# Patient Record
Sex: MALE | Race: WHITE | Employment: OTHER | URBAN - NONMETROPOLITAN AREA
[De-identification: names, ages, dates, MRNs, and addresses within clinical notes are randomized per-mention and may not be internally consistent; named-entity substitution may affect disease eponyms.]

---

## 2018-07-02 LAB
CREATININE, URINE: NORMAL
MICROALBUMIN/CREAT 24H UR: 0.07 MG/G{CREAT}
MICROALBUMIN/CREAT UR-RTO: NORMAL

## 2018-07-17 ENCOUNTER — OFFICE VISIT (OUTPATIENT)
Dept: ORTHOPEDIC SURGERY | Age: 83
End: 2018-07-17
Payer: COMMERCIAL

## 2018-07-17 VITALS
DIASTOLIC BLOOD PRESSURE: 87 MMHG | HEART RATE: 90 BPM | BODY MASS INDEX: 25.33 KG/M2 | HEIGHT: 72 IN | WEIGHT: 187 LBS | TEMPERATURE: 98 F | SYSTOLIC BLOOD PRESSURE: 149 MMHG

## 2018-07-17 DIAGNOSIS — M25.551 HIP PAIN, RIGHT: Primary | ICD-10-CM

## 2018-07-17 PROCEDURE — 99203 OFFICE O/P NEW LOW 30 MIN: CPT | Performed by: ORTHOPAEDIC SURGERY

## 2018-07-17 RX ORDER — MEMANTINE HYDROCHLORIDE 10 MG/1
TABLET ORAL
Refills: 0 | COMMUNITY
Start: 2018-07-02 | End: 2019-08-30 | Stop reason: SDUPTHER

## 2018-07-17 RX ORDER — LISINOPRIL 30 MG/1
TABLET ORAL
Refills: 0 | COMMUNITY
Start: 2018-07-02 | End: 2019-12-02 | Stop reason: ALTCHOICE

## 2018-07-17 RX ORDER — LEVOTHYROXINE SODIUM 137 UG/1
TABLET ORAL
Refills: 0 | COMMUNITY
Start: 2018-07-02 | End: 2019-08-30 | Stop reason: SDUPTHER

## 2018-07-17 RX ORDER — PRAVASTATIN SODIUM 40 MG
TABLET ORAL
Refills: 0 | COMMUNITY
Start: 2018-07-02 | End: 2019-08-30 | Stop reason: SDUPTHER

## 2018-07-17 NOTE — PROGRESS NOTES
New Hip Patient     Referring Provider:   Yvonne agustin, [address]:   Chief Complaint   Patient presents with    Hip Pain     (R) hip pain x 3 month    Results     (R) hip XR Prima        HPI:    Whit López is a 80y.o. year old  male who is seen today  for evaluation of right hip pain. He states the pain is actually more in his distal femur by the knee with radiation up to the hip. Currently he is having no pain. He has not had any treatment. The pain is worse in the morning. It is better throughout the day. He ambulates without assistance. . The patient is not working. PAST MEDICAL HISTORY  Past Medical History:   Diagnosis Date    Arthritis     High cholesterol     Hypertension     Leg pain     Thyroid disease        PAST SURGICAL HISTORY  Past Surgical History:   Procedure Laterality Date    SKIN CANCER EXCISION      face       FAMILY HISTORY   No family history on file. SOCIAL HISTORY  Social History     Occupational History    Not on file.      Social History Main Topics    Smoking status: Current Every Day Smoker     Types: Cigarettes    Smokeless tobacco: Never Used      Comment: roll his own    Alcohol use No      Comment: rare    Drug use: No    Sexual activity: Not on file       CURRENT MEDICATIONS     Current Outpatient Prescriptions:     levothyroxine (SYNTHROID) 137 MCG tablet, take 1 tablet by mouth once daily, Disp: , Rfl: 0    lisinopril (PRINIVIL;ZESTRIL) 30 MG tablet, take 1 tablet by mouth once daily, Disp: , Rfl: 0    memantine (NAMENDA) 10 MG tablet, take 1 tablet by mouth once daily, Disp: , Rfl: 0    pravastatin (PRAVACHOL) 40 MG tablet, take 1 tablet by mouth once daily, Disp: , Rfl: 0    ALLERGIES  No Known Allergies    Controlled Substances Monitoring:      REVIEW OF SYSTEMS:     Constitutional:  Negative for weight loss, fevers, chills, fatigue  Cardiovascular: Negative for chest pain,

## 2019-01-04 LAB
ALBUMIN SERPL-MCNC: NORMAL G/DL
ALP BLD-CCNC: NORMAL U/L
ALT SERPL-CCNC: NORMAL U/L
ANION GAP SERPL CALCULATED.3IONS-SCNC: NORMAL MMOL/L
AST SERPL-CCNC: NORMAL U/L
AVERAGE GLUCOSE: NORMAL
BILIRUB SERPL-MCNC: NORMAL MG/DL
BUN BLDV-MCNC: NORMAL MG/DL
CALCIUM SERPL-MCNC: NORMAL MG/DL
CHLORIDE BLD-SCNC: NORMAL MMOL/L
CO2: NORMAL
CREAT SERPL-MCNC: NORMAL MG/DL
CREATININE: 1 MG/DL
GFR CALCULATED: NORMAL
GLUCOSE BLD-MCNC: NORMAL MG/DL
HBA1C MFR BLD: 5.1 %
POTASSIUM (K+): 4.4
POTASSIUM SERPL-SCNC: NORMAL MMOL/L
SODIUM BLD-SCNC: NORMAL MMOL/L
TOTAL PROTEIN: NORMAL
TSH SERPL DL<=0.05 MIU/L-ACNC: 0.38 UIU/ML

## 2019-03-21 VITALS
BODY MASS INDEX: 24.92 KG/M2 | TEMPERATURE: 97.5 F | HEIGHT: 72 IN | DIASTOLIC BLOOD PRESSURE: 80 MMHG | SYSTOLIC BLOOD PRESSURE: 142 MMHG | WEIGHT: 184 LBS | HEART RATE: 86 BPM

## 2019-03-21 RX ORDER — CLOTRIMAZOLE AND BETAMETHASONE DIPROPIONATE 10; .64 MG/G; MG/G
CREAM TOPICAL 2 TIMES DAILY
COMMUNITY
Start: 2016-05-02 | End: 2019-08-30 | Stop reason: SDUPTHER

## 2019-03-21 RX ORDER — ASPIRIN 325 MG
1 TABLET, DELAYED RELEASE (ENTERIC COATED) ORAL DAILY
COMMUNITY

## 2019-03-21 RX ORDER — LISINOPRIL AND HYDROCHLOROTHIAZIDE 20; 12.5 MG/1; MG/1
1 TABLET ORAL 2 TIMES DAILY
COMMUNITY
Start: 2019-01-28 | End: 2019-08-30 | Stop reason: SDUPTHER

## 2019-03-21 RX ORDER — IRBESARTAN AND HYDROCHLOROTHIAZIDE 300; 12.5 MG/1; MG/1
1 TABLET, FILM COATED ORAL DAILY
COMMUNITY
Start: 2018-09-04 | End: 2019-08-30

## 2019-08-30 ENCOUNTER — OFFICE VISIT (OUTPATIENT)
Dept: FAMILY MEDICINE CLINIC | Age: 84
End: 2019-08-30
Payer: COMMERCIAL

## 2019-08-30 ENCOUNTER — HOSPITAL ENCOUNTER (OUTPATIENT)
Age: 84
Discharge: HOME | End: 2019-09-01
Payer: COMMERCIAL

## 2019-08-30 VITALS
HEART RATE: 83 BPM | WEIGHT: 175.6 LBS | TEMPERATURE: 97.2 F | SYSTOLIC BLOOD PRESSURE: 134 MMHG | BODY MASS INDEX: 23.78 KG/M2 | DIASTOLIC BLOOD PRESSURE: 74 MMHG | OXYGEN SATURATION: 97 % | HEIGHT: 72 IN

## 2019-08-30 DIAGNOSIS — E03.9 HYPOTHYROIDISM, UNSPECIFIED TYPE: ICD-10-CM

## 2019-08-30 DIAGNOSIS — R79.9 ABNORMAL FINDING OF BLOOD CHEMISTRY: ICD-10-CM

## 2019-08-30 DIAGNOSIS — E78.2 MIXED HYPERLIPIDEMIA: ICD-10-CM

## 2019-08-30 DIAGNOSIS — E78.2 MIXED HYPERLIPIDEMIA: Primary | ICD-10-CM

## 2019-08-30 DIAGNOSIS — M19.90 ARTHRITIS: ICD-10-CM

## 2019-08-30 DIAGNOSIS — Z12.5 SCREENING PSA (PROSTATE SPECIFIC ANTIGEN): ICD-10-CM

## 2019-08-30 DIAGNOSIS — I63.9 CEREBROVASCULAR ACCIDENT (CVA), UNSPECIFIED MECHANISM (HCC): ICD-10-CM

## 2019-08-30 DIAGNOSIS — I10 ESSENTIAL HYPERTENSION: ICD-10-CM

## 2019-08-30 LAB
ALBUMIN SERPL-MCNC: 4.5 G/DL (ref 3.5–5.2)
ALP BLD-CCNC: 69 U/L (ref 40–129)
ALT SERPL-CCNC: 19 U/L (ref 0–40)
ANION GAP SERPL CALCULATED.3IONS-SCNC: 18 MMOL/L (ref 7–16)
AST SERPL-CCNC: 27 U/L (ref 0–39)
BASOPHILS ABSOLUTE: 0.16 E9/L (ref 0–0.2)
BASOPHILS RELATIVE PERCENT: 1.5 % (ref 0–2)
BILIRUB SERPL-MCNC: 0.3 MG/DL (ref 0–1.2)
BILIRUBIN URINE: NEGATIVE
BLOOD, URINE: NEGATIVE
BUN BLDV-MCNC: 18 MG/DL (ref 8–23)
CALCIUM SERPL-MCNC: 10.1 MG/DL (ref 8.6–10.2)
CHLORIDE BLD-SCNC: 103 MMOL/L (ref 98–107)
CHOLESTEROL, TOTAL: 154 MG/DL (ref 0–199)
CLARITY: CLEAR
CO2: 21 MMOL/L (ref 22–29)
COLOR: YELLOW
CREAT SERPL-MCNC: 1.2 MG/DL (ref 0.7–1.2)
EOSINOPHILS ABSOLUTE: 0.39 E9/L (ref 0.05–0.5)
EOSINOPHILS RELATIVE PERCENT: 3.6 % (ref 0–6)
GFR AFRICAN AMERICAN: >60
GFR NON-AFRICAN AMERICAN: 58 ML/MIN/1.73
GLUCOSE BLD-MCNC: 89 MG/DL (ref 74–99)
GLUCOSE URINE: NEGATIVE MG/DL
HBA1C MFR BLD: 5.3 % (ref 4–5.6)
HCT VFR BLD CALC: 45.6 % (ref 37–54)
HDLC SERPL-MCNC: 47 MG/DL
HEMOGLOBIN: 14.3 G/DL (ref 12.5–16.5)
IMMATURE GRANULOCYTES #: 0.02 E9/L
IMMATURE GRANULOCYTES %: 0.2 % (ref 0–5)
KETONES, URINE: 15 MG/DL
LDL CHOLESTEROL CALCULATED: 87 MG/DL (ref 0–99)
LEUKOCYTE ESTERASE, URINE: NEGATIVE
LYMPHOCYTES ABSOLUTE: 2.79 E9/L (ref 1.5–4)
LYMPHOCYTES RELATIVE PERCENT: 25.9 % (ref 20–42)
MCH RBC QN AUTO: 32.4 PG (ref 26–35)
MCHC RBC AUTO-ENTMCNC: 31.4 % (ref 32–34.5)
MCV RBC AUTO: 103.2 FL (ref 80–99.9)
MONOCYTES ABSOLUTE: 0.86 E9/L (ref 0.1–0.95)
MONOCYTES RELATIVE PERCENT: 8 % (ref 2–12)
NEUTROPHILS ABSOLUTE: 6.54 E9/L (ref 1.8–7.3)
NEUTROPHILS RELATIVE PERCENT: 60.8 % (ref 43–80)
NITRITE, URINE: NEGATIVE
PDW BLD-RTO: 12.9 FL (ref 11.5–15)
PH UA: 5 (ref 5–9)
PLATELET # BLD: 304 E9/L (ref 130–450)
PMV BLD AUTO: 12 FL (ref 7–12)
POTASSIUM SERPL-SCNC: 4.1 MMOL/L (ref 3.5–5)
PROSTATE SPECIFIC ANTIGEN: 3.96 NG/ML (ref 0–4)
PROTEIN UA: NEGATIVE MG/DL
RBC # BLD: 4.42 E12/L (ref 3.8–5.8)
SODIUM BLD-SCNC: 142 MMOL/L (ref 132–146)
SPECIFIC GRAVITY UA: 1.02 (ref 1–1.03)
TOTAL PROTEIN: 8.4 G/DL (ref 6.4–8.3)
TRIGL SERPL-MCNC: 101 MG/DL (ref 0–149)
TSH SERPL DL<=0.05 MIU/L-ACNC: 0.34 UIU/ML (ref 0.27–4.2)
UROBILINOGEN, URINE: 1 E.U./DL
VLDLC SERPL CALC-MCNC: 20 MG/DL
WBC # BLD: 10.8 E9/L (ref 4.5–11.5)

## 2019-08-30 PROCEDURE — G0009 ADMIN PNEUMOCOCCAL VACCINE: HCPCS | Performed by: NURSE PRACTITIONER

## 2019-08-30 PROCEDURE — 84443 ASSAY THYROID STIM HORMONE: CPT

## 2019-08-30 PROCEDURE — 90670 PCV13 VACCINE IM: CPT | Performed by: NURSE PRACTITIONER

## 2019-08-30 PROCEDURE — 81003 URINALYSIS AUTO W/O SCOPE: CPT

## 2019-08-30 PROCEDURE — 80053 COMPREHEN METABOLIC PANEL: CPT

## 2019-08-30 PROCEDURE — 85025 COMPLETE CBC W/AUTO DIFF WBC: CPT

## 2019-08-30 PROCEDURE — 36415 COLL VENOUS BLD VENIPUNCTURE: CPT

## 2019-08-30 PROCEDURE — 99214 OFFICE O/P EST MOD 30 MIN: CPT | Performed by: NURSE PRACTITIONER

## 2019-08-30 PROCEDURE — 80061 LIPID PANEL: CPT

## 2019-08-30 PROCEDURE — G0103 PSA SCREENING: HCPCS

## 2019-08-30 PROCEDURE — 83036 HEMOGLOBIN GLYCOSYLATED A1C: CPT

## 2019-08-30 RX ORDER — LISINOPRIL AND HYDROCHLOROTHIAZIDE 20; 12.5 MG/1; MG/1
1 TABLET ORAL 2 TIMES DAILY
Qty: 90 TABLET | Refills: 2 | Status: SHIPPED | OUTPATIENT
Start: 2019-08-30 | End: 2019-12-03 | Stop reason: SDUPTHER

## 2019-08-30 RX ORDER — CLOTRIMAZOLE AND BETAMETHASONE DIPROPIONATE 10; .64 MG/G; MG/G
CREAM TOPICAL 2 TIMES DAILY
Qty: 45 G | Refills: 2 | Status: SHIPPED | OUTPATIENT
Start: 2019-08-30 | End: 2022-05-27

## 2019-08-30 RX ORDER — LEVOTHYROXINE SODIUM 137 UG/1
TABLET ORAL
Qty: 90 TABLET | Refills: 2 | Status: SHIPPED | OUTPATIENT
Start: 2019-08-30 | End: 2020-07-31

## 2019-08-30 RX ORDER — MEMANTINE HYDROCHLORIDE 10 MG/1
TABLET ORAL
Qty: 180 TABLET | Refills: 2 | Status: SHIPPED | OUTPATIENT
Start: 2019-08-30 | End: 2020-07-31

## 2019-08-30 RX ORDER — PRAVASTATIN SODIUM 40 MG
TABLET ORAL
Qty: 90 TABLET | Refills: 2 | Status: SHIPPED | OUTPATIENT
Start: 2019-08-30 | End: 2020-07-31

## 2019-08-30 RX ORDER — LISINOPRIL 30 MG/1
TABLET ORAL
Qty: 90 TABLET | Refills: 2 | Status: CANCELLED | OUTPATIENT
Start: 2019-08-30

## 2019-08-30 ASSESSMENT — ENCOUNTER SYMPTOMS
EYE PAIN: 0
ABDOMINAL PAIN: 0
DIARRHEA: 1
SHORTNESS OF BREATH: 0
CONSTIPATION: 1
COUGH: 0
TROUBLE SWALLOWING: 0
EYE DISCHARGE: 0
CHEST TIGHTNESS: 0

## 2019-08-30 ASSESSMENT — PATIENT HEALTH QUESTIONNAIRE - PHQ9
2. FEELING DOWN, DEPRESSED OR HOPELESS: 0
SUM OF ALL RESPONSES TO PHQ QUESTIONS 1-9: 0
SUM OF ALL RESPONSES TO PHQ9 QUESTIONS 1 & 2: 0
1. LITTLE INTEREST OR PLEASURE IN DOING THINGS: 0
SUM OF ALL RESPONSES TO PHQ QUESTIONS 1-9: 0

## 2019-08-30 NOTE — PROGRESS NOTES
in about 4 months (around 12/30/2019). An electronic signature was used to authenticate this note.     --LAMONT Price - CNP on 8/30/2019 at 12:49 PM

## 2019-10-14 ENCOUNTER — OFFICE VISIT (OUTPATIENT)
Dept: FAMILY MEDICINE CLINIC | Age: 84
End: 2019-10-14
Payer: COMMERCIAL

## 2019-10-14 VITALS
SYSTOLIC BLOOD PRESSURE: 138 MMHG | TEMPERATURE: 97.9 F | BODY MASS INDEX: 23.73 KG/M2 | WEIGHT: 175 LBS | DIASTOLIC BLOOD PRESSURE: 66 MMHG

## 2019-10-14 DIAGNOSIS — I10 ESSENTIAL HYPERTENSION: Primary | ICD-10-CM

## 2019-10-14 DIAGNOSIS — I63.9 CEREBROVASCULAR ACCIDENT (CVA), UNSPECIFIED MECHANISM (HCC): ICD-10-CM

## 2019-10-14 DIAGNOSIS — R41.89 COGNITIVE IMPAIRMENT: ICD-10-CM

## 2019-10-14 PROCEDURE — 99213 OFFICE O/P EST LOW 20 MIN: CPT | Performed by: NURSE PRACTITIONER

## 2019-10-14 PROCEDURE — G0008 ADMIN INFLUENZA VIRUS VAC: HCPCS | Performed by: NURSE PRACTITIONER

## 2019-10-14 PROCEDURE — 90653 IIV ADJUVANT VACCINE IM: CPT | Performed by: NURSE PRACTITIONER

## 2019-10-14 ASSESSMENT — ENCOUNTER SYMPTOMS
EYE DISCHARGE: 0
EYE PAIN: 0
SHORTNESS OF BREATH: 0
CHEST TIGHTNESS: 0
CONSTIPATION: 1
COUGH: 0
DIARRHEA: 1
TROUBLE SWALLOWING: 0
ABDOMINAL PAIN: 0

## 2019-10-17 ENCOUNTER — TELEPHONE (OUTPATIENT)
Dept: FAMILY MEDICINE CLINIC | Age: 84
End: 2019-10-17

## 2019-12-03 RX ORDER — LISINOPRIL AND HYDROCHLOROTHIAZIDE 20; 12.5 MG/1; MG/1
1 TABLET ORAL 2 TIMES DAILY
Qty: 180 TABLET | Refills: 2 | Status: SHIPPED | OUTPATIENT
Start: 2019-12-03 | End: 2020-08-14 | Stop reason: SDUPTHER

## 2020-01-06 ENCOUNTER — OFFICE VISIT (OUTPATIENT)
Dept: FAMILY MEDICINE CLINIC | Age: 85
End: 2020-01-06
Payer: MEDICARE

## 2020-01-06 VITALS
WEIGHT: 171.6 LBS | SYSTOLIC BLOOD PRESSURE: 122 MMHG | DIASTOLIC BLOOD PRESSURE: 66 MMHG | HEART RATE: 88 BPM | TEMPERATURE: 97.4 F | HEIGHT: 72 IN | BODY MASS INDEX: 23.24 KG/M2 | OXYGEN SATURATION: 96 %

## 2020-01-06 PROCEDURE — G8427 DOCREV CUR MEDS BY ELIG CLIN: HCPCS | Performed by: NURSE PRACTITIONER

## 2020-01-06 PROCEDURE — 4004F PT TOBACCO SCREEN RCVD TLK: CPT | Performed by: NURSE PRACTITIONER

## 2020-01-06 PROCEDURE — G8482 FLU IMMUNIZE ORDER/ADMIN: HCPCS | Performed by: NURSE PRACTITIONER

## 2020-01-06 PROCEDURE — G8420 CALC BMI NORM PARAMETERS: HCPCS | Performed by: NURSE PRACTITIONER

## 2020-01-06 PROCEDURE — 1123F ACP DISCUSS/DSCN MKR DOCD: CPT | Performed by: NURSE PRACTITIONER

## 2020-01-06 PROCEDURE — 4040F PNEUMOC VAC/ADMIN/RCVD: CPT | Performed by: NURSE PRACTITIONER

## 2020-01-06 PROCEDURE — 99214 OFFICE O/P EST MOD 30 MIN: CPT | Performed by: NURSE PRACTITIONER

## 2020-01-06 ASSESSMENT — ENCOUNTER SYMPTOMS
DIARRHEA: 1
EYE DISCHARGE: 0
TROUBLE SWALLOWING: 0
COUGH: 0
EYE PAIN: 0
ABDOMINAL PAIN: 0
CONSTIPATION: 1
SHORTNESS OF BREATH: 0
CHEST TIGHTNESS: 0

## 2020-01-06 ASSESSMENT — PATIENT HEALTH QUESTIONNAIRE - PHQ9
2. FEELING DOWN, DEPRESSED OR HOPELESS: 0
SUM OF ALL RESPONSES TO PHQ QUESTIONS 1-9: 0
SUM OF ALL RESPONSES TO PHQ QUESTIONS 1-9: 0
SUM OF ALL RESPONSES TO PHQ9 QUESTIONS 1 & 2: 0
1. LITTLE INTEREST OR PLEASURE IN DOING THINGS: 0

## 2020-01-06 NOTE — PROGRESS NOTES
2020     Yumiko Hdz (:  1935) is a 80 y.o. male, here for evaluation of the following medical concerns:  Chief Complaint   Patient presents with    Hypothyroidism    Hyperlipidemia    Hypertension    Hip Pain     L side, low, onset off and on for months, intermittent     HPI:  80 y.o. male presents   Wife and daughter present  Memory has not worsened per family  Still some dizziness upon arising quickly. Some ataxia per wife . Discussed use of cane. Vision impaired but won't go back. Needs to see specialist and will try to get him to one  I tried really hard to get him to stop smoking or at least cut down. No NSAIDS for now and no more than 1  81mg ASA. Use Tylenol. Having irritable bowel , constipation then diarrhea  Wife states he is doing ok but won't walk and is still rather difficult. It hurts so much in the right hip. Doesn't move around and eats lots of candy and states he is happy  Smokes at least 2pks a day  He just marches to his own tune. He thinks all meds are evil, people die when they stop smoking and he  is doing just fine.   Wife states everything is going pretty well right now      Past Medical History:   Diagnosis Date    Arthritis     [facility] [address]     High cholesterol     Hypertension     Hypothyroidism     Leg pain     Osteoarthritis     Skin cancer     Stroke [facility])     [id number]    Thyroid disease        Current Outpatient Medications on File Prior to Visit   Medication Sig Dispense Refill    lisinopril-hydrochlorothiazide (PRINZIDE;ZESTORETIC) 20-12.5 MG per tablet Take 1 tablet by mouth 2 times daily 180 tablet 2    pravastatin (PRAVACHOL) 40 MG tablet take 1 tablet by mouth once daily 90 tablet 2    memantine (NAMENDA) 10 MG tablet One am and pm 180 tablet 2    levothyroxine (SYNTHROID) 137 MCG tablet take 1 tablet by mouth once daily 90 tablet 2    clotrimazole-betamethasone (LOTRISONE) 1-0.05 % cream Apply topically 2 times daily 45 g 2    Bioflavonoid Products (VITAMIN C PLUS) 1000 MG TABS Take 1 tablet by mouth daily      vitamin D (CHOLECALCIFEROL) 1000 UNIT TABS tablet Take 1 tablet by mouth daily      aspirin 325 MG EC tablet Take 1 tablet by mouth daily       No current facility-administered medications on file prior to visit. No Known Allergies    Family History   Problem Relation Age of Onset    Stroke Mother     Coronary Art Dis Mother     Other Father         ANEURYSM    Cancer Sister         LUNG - SMOKER    No Known Problems Sister        Past Surgical History:   Procedure Laterality Date    SKIN CANCER EXCISION      face       Social History     Tobacco Use    Smoking status: Current Every Day Smoker     Packs/day: 2.00     Years: 60.00     Pack years: 120.00     Types: Cigarettes    Smokeless tobacco: Never Used    Tobacco comment: roll his own   Substance Use Topics    Alcohol use: Not Currently     Comment: rare    Drug use: No       ROS:      Review of Systems   Constitutional: Negative for fatigue. HENT: Negative for congestion, ear pain and trouble swallowing. Eyes: Positive for visual disturbance. Negative for pain and discharge. Respiratory: Negative for cough, chest tightness and shortness of breath. Cardiovascular: Negative for chest pain, palpitations and leg swelling. Gastrointestinal: Positive for constipation and diarrhea. Negative for abdominal pain. Endocrine: Positive for cold intolerance. Genitourinary: Negative for difficulty urinating, discharge and testicular pain. Musculoskeletal: Positive for arthralgias and gait problem. Skin: Negative for rash. Neurological: Negative for dizziness, weakness, numbness and headaches. Hematological: Does not bruise/bleed easily. Psychiatric/Behavioral: Negative.             Vitals:    01/06/20 1147   BP: 122/66   Site: Right Upper Arm   Position: Sitting   Cuff Size: Large Adult   Pulse: 88   Temp: 97.4 °F (36.3 °C) TempSrc: Temporal   SpO2: 96%   Weight: 171 lb 9.6 oz (77.8 kg)   Height: 6' (1.829 m)     Estimated body mass index is 23.27 kg/m² as calculated from the following:    Height as of this encounter: 6' (1.829 m). Weight as of this encounter: 171 lb 9.6 oz (77.8 kg). PHYSICAL EXAM:    VS:  Blood pressure 122/66, pulse 88, temperature 97.4 °F (36.3 °C), temperature source Temporal, height 6' (1.829 m), weight 171 lb 9.6 oz (77.8 kg), SpO2 96 %. Physical Exam  Vitals signs reviewed. Constitutional:       General: He is not in acute distress. Appearance: He is well-developed. HENT:      Head: Normocephalic. Right Ear: Hearing and external ear normal.      Left Ear: Hearing and external ear normal.      Nose: Nose normal. No mucosal edema or rhinorrhea. Comments: Small cyst end of nose and prominent blood vessel     Mouth/Throat:      Pharynx: No oropharyngeal exudate. Eyes:      General: Lids are normal.      Conjunctiva/sclera: Conjunctivae normal.      Pupils: Pupils are equal, round, and reactive to light. Neck:      Musculoskeletal: Normal range of motion and neck supple. Thyroid: No thyromegaly. Vascular: No carotid bruit or JVD. Cardiovascular:      Rate and Rhythm: Normal rate and regular rhythm. Heart sounds: Normal heart sounds, S1 normal and S2 normal. No murmur. Pulmonary:      Effort: Pulmonary effort is normal. No respiratory distress. Breath sounds: No wheezing or rales. Abdominal:      General: Bowel sounds are normal. There is no distension. Palpations: Abdomen is soft. There is no mass. Tenderness: There is no tenderness. Musculoskeletal:         General: No deformity. Comments: Poor flexibility and ROM of LE   Skin:     General: Skin is warm and dry. Capillary Refill: Capillary refill takes less than 2 seconds. Findings: No rash. Nails: There is no clubbing.         Comments: On LL   Neurological:      Mental Standing Expiration Date:   1/6/2021     Order Specific Question:   Is Patient Fasting?/# of Hours     Answer:   8    External Referral To Ophthalmology     Referral Priority:   Routine     Referral Type:   Eval and Treat     Referral Reason:   Specialty Services Required     Referred to Provider:   Ashish Franz MD     Requested Specialty:   Ophthalmology     Number of Visits Requested:   [address] [facility], Baron Loli DPM, Podiatry, [place]     Referral Priority:   Routine     Referral Type:   Eval and Treat     Referral Reason:   Specialty Services Required     Referred to Provider:   Matt Benton DPM     Requested Specialty:   Podiatry     Number of Visits Requested:   1        Return in about 4 months (around 5/6/2020). An electronic signature was used to authenticate this note.     --Kacie Woodward [address], APRN - CNP on 1/6/2020 at 12:30 PM

## 2020-01-22 RX ORDER — VITAMIN B COMPLEX
1 TABLET ORAL
COMMUNITY

## 2020-05-04 ENCOUNTER — VIRTUAL VISIT (OUTPATIENT)
Dept: PRIMARY CARE CLINIC | Age: 85
End: 2020-05-04
Payer: MEDICARE

## 2020-05-04 VITALS — BODY MASS INDEX: 22.35 KG/M2 | WEIGHT: 165 LBS | HEIGHT: 72 IN

## 2020-05-04 PROCEDURE — 99442 PR PHYS/QHP TELEPHONE EVALUATION 11-20 MIN: CPT | Performed by: NURSE PRACTITIONER

## 2020-05-04 ASSESSMENT — ENCOUNTER SYMPTOMS
TROUBLE SWALLOWING: 0
EYE PAIN: 0
CONSTIPATION: 1
DIARRHEA: 1
EYE DISCHARGE: 0
COUGH: 0
SHORTNESS OF BREATH: 0
ABDOMINAL PAIN: 0
CHEST TIGHTNESS: 0

## 2020-05-04 NOTE — PROGRESS NOTES
2020     Cristina Dee (:  1935) is a 80 y.o. male, here for evaluation of the following medical concerns:  Chief Complaint   Patient presents with    Arthritis     Pt staes he is doing okay. Cristina Dee is a 80 y.o. male evaluated via telephone on 2020. Consent:  He and/or health care decision maker is aware that that he may receive a bill for this telephone service, depending on his insurance coverage, and has provided verbal consent to proceed: Yes      Documentation:  I communicated with the patient and/or health care decision maker about see below. Details of this discussion including any medical advice provided: see below      I affirm this is a Patient Initiated Episode with a Patient who has not had a related appointment within my department in the past 7 days or scheduled within the next 24 hours. Patient identification was verified at the start of the visit: Yes    Total Time: minutes: 11-20 minutes    Note: not billable if this call serves to triage the patient into an appointment for the relevant concern      Alon Dhaliwal   HPI:  80 y.o. male presents for telephone visit. I also conferred with wife  Memory has not worsened per family  Still some dizziness upon arising quickly. Some ataxia per wife . Discussed use of cane. BP readings 117-130/68-80  Vision impaired, got glasses but won't wear. I discussed smoking cessation or at least cut down, he said maybe a pack and half daily right now. No NSAIDS for now and no more than 1 81mg ASA. Use Tylenol. Having irritable bowel , constipation then diarrhea  Wife states he is doing ok but won't walk and is still rather difficult. It hurts so much in the right hip. Doesn't move around and eats lots of candy and states he is happy    He just marches to his own tune. He thinks all meds are evil, people die when they stop smoking and he  is doing just fine. Wife states everything is going pretty well right now.  She states note.    --LAMONT Mariscal - CNP on 5/5/2020 at 11:46 AM

## 2020-05-13 ENCOUNTER — HOSPITAL ENCOUNTER (OUTPATIENT)
Age: 85
Discharge: HOME | End: 2020-05-15
Payer: MEDICARE

## 2020-05-13 LAB
ALBUMIN SERPL-MCNC: 4.3 G/DL (ref 3.5–5.2)
ALP BLD-CCNC: 60 U/L (ref 40–129)
ALT SERPL-CCNC: 12 U/L (ref 0–40)
ANION GAP SERPL CALCULATED.3IONS-SCNC: 20 MMOL/L (ref 7–16)
AST SERPL-CCNC: 20 U/L (ref 0–39)
BILIRUB SERPL-MCNC: 0.5 MG/DL (ref 0–1.2)
BILIRUBIN URINE: NEGATIVE
BLOOD, URINE: NEGATIVE
BUN BLDV-MCNC: 15 MG/DL (ref 8–23)
CALCIUM SERPL-MCNC: 10.1 MG/DL (ref 8.6–10.2)
CHLORIDE BLD-SCNC: 103 MMOL/L (ref 98–107)
CHOLESTEROL, TOTAL: 160 MG/DL (ref 0–199)
CLARITY: CLEAR
CO2: 22 MMOL/L (ref 22–29)
COLOR: YELLOW
CREAT SERPL-MCNC: 1.4 MG/DL (ref 0.7–1.2)
GFR AFRICAN AMERICAN: 58
GFR NON-AFRICAN AMERICAN: 48 ML/MIN/1.73
GLUCOSE BLD-MCNC: 83 MG/DL (ref 74–99)
GLUCOSE URINE: NEGATIVE MG/DL
HBA1C MFR BLD: 5.5 % (ref 4–5.6)
HCT VFR BLD CALC: 47 % (ref 37–54)
HDLC SERPL-MCNC: 49 MG/DL
HEMOGLOBIN: 14.5 G/DL (ref 12.5–16.5)
KETONES, URINE: NEGATIVE MG/DL
LDL CHOLESTEROL CALCULATED: 98 MG/DL (ref 0–99)
LEUKOCYTE ESTERASE, URINE: NEGATIVE
MCH RBC QN AUTO: 31.7 PG (ref 26–35)
MCHC RBC AUTO-ENTMCNC: 30.9 % (ref 32–34.5)
MCV RBC AUTO: 102.6 FL (ref 80–99.9)
NITRITE, URINE: NEGATIVE
PDW BLD-RTO: 12.9 FL (ref 11.5–15)
PH UA: 5.5 (ref 5–9)
PLATELET # BLD: 304 E9/L (ref 130–450)
PMV BLD AUTO: 12 FL (ref 7–12)
POTASSIUM SERPL-SCNC: 4.2 MMOL/L (ref 3.5–5)
PROTEIN UA: NEGATIVE MG/DL
RBC # BLD: 4.58 E12/L (ref 3.8–5.8)
SODIUM BLD-SCNC: 145 MMOL/L (ref 132–146)
SPECIFIC GRAVITY UA: 1.02 (ref 1–1.03)
TOTAL PROTEIN: 8.3 G/DL (ref 6.4–8.3)
TRIGL SERPL-MCNC: 63 MG/DL (ref 0–149)
TSH SERPL DL<=0.05 MIU/L-ACNC: 0.89 UIU/ML (ref 0.27–4.2)
UROBILINOGEN, URINE: 1 E.U./DL
VLDLC SERPL CALC-MCNC: 13 MG/DL
WBC # BLD: 7 E9/L (ref 4.5–11.5)

## 2020-05-13 PROCEDURE — 81003 URINALYSIS AUTO W/O SCOPE: CPT

## 2020-05-13 PROCEDURE — 80053 COMPREHEN METABOLIC PANEL: CPT

## 2020-05-13 PROCEDURE — 85027 COMPLETE CBC AUTOMATED: CPT

## 2020-05-13 PROCEDURE — 36415 COLL VENOUS BLD VENIPUNCTURE: CPT

## 2020-05-13 PROCEDURE — 80061 LIPID PANEL: CPT

## 2020-05-13 PROCEDURE — 83036 HEMOGLOBIN GLYCOSYLATED A1C: CPT

## 2020-05-13 PROCEDURE — 84443 ASSAY THYROID STIM HORMONE: CPT

## 2020-07-31 RX ORDER — MEMANTINE HYDROCHLORIDE 10 MG/1
TABLET ORAL
Qty: 60 TABLET | Refills: 0 | Status: SHIPPED | OUTPATIENT
Start: 2020-07-31

## 2020-07-31 RX ORDER — PRAVASTATIN SODIUM 40 MG
TABLET ORAL
Qty: 30 TABLET | Refills: 0 | Status: SHIPPED
Start: 2020-07-31 | End: 2022-09-28 | Stop reason: SDUPTHER

## 2020-07-31 RX ORDER — LEVOTHYROXINE SODIUM 137 UG/1
TABLET ORAL
Qty: 30 TABLET | Refills: 0 | Status: SHIPPED
Start: 2020-07-31 | End: 2020-09-24

## 2020-08-14 RX ORDER — LISINOPRIL AND HYDROCHLOROTHIAZIDE 20; 12.5 MG/1; MG/1
1 TABLET ORAL 2 TIMES DAILY
Qty: 180 TABLET | Refills: 1 | Status: SHIPPED | OUTPATIENT
Start: 2020-08-14

## 2020-09-24 RX ORDER — LEVOTHYROXINE SODIUM 137 UG/1
TABLET ORAL
Qty: 30 TABLET | Refills: 0 | Status: SHIPPED | OUTPATIENT
Start: 2020-09-24 | End: 2022-01-07

## 2020-12-08 ENCOUNTER — TELEPHONE (OUTPATIENT)
Dept: PHARMACY | Facility: CLINIC | Age: 85
End: 2020-12-08

## 2020-12-08 NOTE — TELEPHONE ENCOUNTER
CLINICAL PHARMACY: ADHERENCE REVIEW  Identified care gap per Serge; fills at [facility]: ACE/ARB adherence    Last Office Visit: 5/4/20    ASSESSMENT  ACE/ARB ADHERENCE  PDC: n/a   Potential Fail Date: 12/10/20    Per [facility] pharmacy:  Prescription available for patient  for 90 d/s. 1 RF remaining. Billed through [facility]. Per [facility] Records    LISINOP/HCTZ TAB 20-12.5 last filled on 8/14/20 for a 90 day supply    BP Readings from Last 3 Encounters:   01/06/20 122/66   10/14/19 138/66   08/30/19 134/74     CrCl cannot be calculated (Patient's most recent lab result is older than the maximum 120 days allowed. ). STATIN ADHERENCE  PDC: 86%  PASS    Per Insurance Records   PRAVASTATIN TAB 40MG last filled on 10/15/20 for a 90 day supply    Lab Results   Component Value Date    CHOL 160 05/13/2020    TRIG 63 05/13/2020    HDL 49 05/13/2020    LDLCALC 98 05/13/2020     ALT   Date Value Ref Range Status   05/13/2020 12 0 - 40 U/L Final     AST   Date Value Ref Range Status   05/13/2020 20 0 - 39 U/L Final     The ASCVD Risk score (Demi Contreras., et al., 2013) failed to calculate for the following reasons: The 2013 ASCVD risk score is only valid for ages 36 to 78    The patient has a prior MI or stroke diagnosis     PLAN    Spoke to patient's wife on behalf of patient. She stated that he doesn't feel well all the time and sleeps a lot. Suggested that setting an alarm to remind him or wake him up to take medication may help adherence. She agreed to  medication for Lisinopril/HCTZ from pharmacy today or tomorrow. No further action needed.

## 2020-12-08 NOTE — TELEPHONE ENCOUNTER
CLINICAL PHARMACY CONSULT: MED RECONCILIATION/REVIEW ADDENDUM    For Pharmacy Admin Tracking Only    PHSO: Yes  Total # of Interventions Recommended: 1  - New Order #: 0 New Medication Order Reason(s):  Adherence  - Maintenance Safety Lab Monitoring #: 1  - New Therapy Lab Monitoring #: 1  Recommended intervention potential cost savings: 1Total Interventions Accepted: 0  Time Spent (min): [address], [address] [facility]

## 2021-01-28 ENCOUNTER — OFFICE VISIT (OUTPATIENT)
Dept: FAMILY MEDICINE CLINIC | Age: 86
End: 2021-01-28
Payer: MEDICARE

## 2021-01-28 VITALS
HEART RATE: 90 BPM | OXYGEN SATURATION: 98 % | SYSTOLIC BLOOD PRESSURE: 132 MMHG | HEIGHT: 72 IN | BODY MASS INDEX: 22.48 KG/M2 | TEMPERATURE: 97.3 F | DIASTOLIC BLOOD PRESSURE: 62 MMHG | WEIGHT: 166 LBS

## 2021-01-28 DIAGNOSIS — I63.9 CEREBROVASCULAR ACCIDENT (CVA), UNSPECIFIED MECHANISM (HCC): ICD-10-CM

## 2021-01-28 DIAGNOSIS — R73.01 IMPAIRED FASTING GLUCOSE: ICD-10-CM

## 2021-01-28 DIAGNOSIS — E03.9 HYPOTHYROIDISM, UNSPECIFIED TYPE: Primary | ICD-10-CM

## 2021-01-28 DIAGNOSIS — E03.9 HYPOTHYROIDISM, UNSPECIFIED TYPE: ICD-10-CM

## 2021-01-28 DIAGNOSIS — E78.2 MIXED HYPERLIPIDEMIA: ICD-10-CM

## 2021-01-28 DIAGNOSIS — R41.89 COGNITIVE IMPAIRMENT: ICD-10-CM

## 2021-01-28 LAB — TSH SERPL DL<=0.05 MIU/L-ACNC: 0.84 UIU/ML (ref 0.27–4.2)

## 2021-01-28 PROCEDURE — G8427 DOCREV CUR MEDS BY ELIG CLIN: HCPCS | Performed by: FAMILY MEDICINE

## 2021-01-28 PROCEDURE — 3288F FALL RISK ASSESSMENT DOCD: CPT | Performed by: FAMILY MEDICINE

## 2021-01-28 PROCEDURE — 99214 OFFICE O/P EST MOD 30 MIN: CPT | Performed by: FAMILY MEDICINE

## 2021-01-28 PROCEDURE — 4040F PNEUMOC VAC/ADMIN/RCVD: CPT | Performed by: FAMILY MEDICINE

## 2021-01-28 PROCEDURE — 1123F ACP DISCUSS/DSCN MKR DOCD: CPT | Performed by: FAMILY MEDICINE

## 2021-01-28 PROCEDURE — G8420 CALC BMI NORM PARAMETERS: HCPCS | Performed by: FAMILY MEDICINE

## 2021-01-28 PROCEDURE — G8510 SCR DEP NEG, NO PLAN REQD: HCPCS | Performed by: FAMILY MEDICINE

## 2021-01-28 PROCEDURE — G8484 FLU IMMUNIZE NO ADMIN: HCPCS | Performed by: FAMILY MEDICINE

## 2021-01-28 PROCEDURE — 4004F PT TOBACCO SCREEN RCVD TLK: CPT | Performed by: FAMILY MEDICINE

## 2021-01-28 ASSESSMENT — PATIENT HEALTH QUESTIONNAIRE - PHQ9
SUM OF ALL RESPONSES TO PHQ9 QUESTIONS 1 & 2: 0
SUM OF ALL RESPONSES TO PHQ QUESTIONS 1-9: 0
2. FEELING DOWN, DEPRESSED OR HOPELESS: 0
SUM OF ALL RESPONSES TO PHQ QUESTIONS 1-9: 0
1. LITTLE INTEREST OR PLEASURE IN DOING THINGS: 0

## 2021-01-28 NOTE — PROGRESS NOTES
Disp: 45 g, Rfl: 2    Bioflavonoid Products (VITAMIN C PLUS) 1000 MG TABS, Take 1 tablet by mouth daily, Disp: , Rfl:     vitamin D (CHOLECALCIFEROL) 1000 UNIT TABS tablet, Take 1 tablet by mouth daily, Disp: , Rfl:     aspirin 325 MG EC tablet, Take 1 tablet by mouth daily, Disp: , Rfl:      Past Medical History:   Diagnosis Date    [facility] ([facility] [address]     Arthritis     [facility]     High cholesterol     Hypertension     Hypothyroidism     Leg pain     Osteoarthritis     Skin cancer     Stroke [facility])     [phone]    Thyroid disease        Berle Mood was seen today for established new doctor. Diagnoses and all orders for this visit:    Hypothyroidism, unspecified type  -     TSH without Reflex;  Future    Cerebrovascular accident (CVA), unspecified mechanism ([facility]    Impaired fasting glucose     Mixed hyperlipidemia    Cognitive impairment       Check TSH  Declines flu and COVID vaccines  Will defer aggressive interventions, pt and wife agree    Kat Guzman MD

## 2022-01-07 ENCOUNTER — OFFICE VISIT (OUTPATIENT)
Dept: FAMILY MEDICINE CLINIC | Age: 87
End: 2022-01-07
Payer: MEDICARE

## 2022-01-07 ENCOUNTER — TELEPHONE (OUTPATIENT)
Dept: FAMILY MEDICINE CLINIC | Age: 87
End: 2022-01-07

## 2022-01-07 VITALS
DIASTOLIC BLOOD PRESSURE: 60 MMHG | SYSTOLIC BLOOD PRESSURE: 120 MMHG | OXYGEN SATURATION: 96 % | TEMPERATURE: 97 F | HEART RATE: 91 BPM | HEIGHT: 72 IN | BODY MASS INDEX: 22.51 KG/M2

## 2022-01-07 DIAGNOSIS — M79.605 PAIN OF LEFT LOWER EXTREMITY: ICD-10-CM

## 2022-01-07 DIAGNOSIS — M25.562 ACUTE PAIN OF LEFT KNEE: ICD-10-CM
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
## 2022-01-07 DIAGNOSIS — M25.559 HIP PAIN: ICD-10-CM

## 2022-01-07 PROCEDURE — G8484 FLU IMMUNIZE NO ADMIN: HCPCS | Performed by: INTERNAL MEDICINE

## 2022-01-07 PROCEDURE — 99213 OFFICE O/P EST LOW 20 MIN: CPT | Performed by: INTERNAL MEDICINE

## 2022-01-07 PROCEDURE — G8427 DOCREV CUR MEDS BY ELIG CLIN: HCPCS | Performed by: INTERNAL MEDICINE

## 2022-01-07 PROCEDURE — 1123F ACP DISCUSS/DSCN MKR DOCD: CPT | Performed by: INTERNAL MEDICINE

## 2022-01-07 PROCEDURE — G8420 CALC BMI NORM PARAMETERS: HCPCS | Performed by: INTERNAL MEDICINE

## 2022-01-07 PROCEDURE — 4040F PNEUMOC VAC/ADMIN/RCVD: CPT | Performed by: INTERNAL MEDICINE

## 2022-01-07 PROCEDURE — 4004F PT TOBACCO SCREEN RCVD TLK: CPT | Performed by: INTERNAL MEDICINE

## 2022-01-07 RX ORDER — MIRTAZAPINE 15 MG/1
TABLET, FILM COATED ORAL
COMMUNITY
Start: 2022-01-05

## 2022-01-07 RX ORDER — LEVOTHYROXINE SODIUM 0.15 MG/1
TABLET ORAL
COMMUNITY
Start: 2022-01-01 | End: 2022-05-31

## 2022-01-08 ASSESSMENT — ENCOUNTER SYMPTOMS
ABDOMINAL PAIN: 0
SHORTNESS OF BREATH: 0

## 2022-01-08 NOTE — PROGRESS NOTES
[address] [place]     22  Elva Fontanez : 1935 Sex: male  Age: 80 y.o. Chief Complaint   Patient presents with    Hip Pain     left side, fell last week        HPI    Patient presents to express care today accompanied by daughter and wife currently in wheelchair stating that he fell about a week ago in the kitchen. Apparently tripped on a rug. He is extremely hard of hearing apparently has some degree of dementia most of the conversation with family. Has been complaining of pain to his left hip and leg area states it is way down from the hip to the foot. Apparently had a hard time standing although he was able to stand here in the office when I got him up. Family was somewhat surprised. Apparently has been using some Tylenol. Denies any loss of continence of bowel or bladder. Is really not complaining of back pain. States he did not hit his head and did not lose consciousness although he cannot really remember too much about the event. Wife states she witnessed it. Review of Systems   Respiratory: Negative for shortness of breath. Cardiovascular: Negative for chest pain, palpitations and leg swelling. Gastrointestinal: Negative for abdominal pain. Genitourinary: Negative for dysuria and frequency. Musculoskeletal:        See above   Skin: Negative for rash. Neurological: Negative for dizziness, weakness, light-headedness, numbness and headaches. REST OF PERTINENT ROS GONE OVER AND WAS NEGATIVE.              Current Outpatient Medications:     levothyroxine (SYNTHROID) 150 MCG tablet, , Disp: , Rfl:     lisinopril-hydroCHLOROthiazide (PRINZIDE;ZESTORETIC) 20-12.5 MG per tablet, Take 1 tablet by mouth 2 times daily, Disp: 180 tablet, Rfl: 1    pravastatin (PRAVACHOL) 40 MG tablet, take 1 tablet by mouth once daily, Disp: 30 tablet, Rfl: 0    memantine (NAMENDA) 10 MG tablet, TAKE 1 TABLET BY MOUTH EVERY MORNING AND EVENING, Disp: 60 tablet, Rfl: 0    B Complex Vitamins (VITAMIN B-COMPLEX) TABS, Take 1 tablet by mouth everyday, Disp: , Rfl:     Bioflavonoid Products (VITAMIN C PLUS) 1000 MG TABS, Take 1 tablet by mouth daily, Disp: , Rfl:     vitamin D (CHOLECALCIFEROL) 1000 UNIT TABS tablet, Take 1 tablet by mouth daily, Disp: , Rfl:     aspirin 325 MG EC tablet, Take 1 tablet by mouth daily, Disp: , Rfl:     mirtazapine (REMERON) 15 MG tablet, , Disp: , Rfl:     clotrimazole-betamethasone (LOTRISONE) 1-0.05 % cream, Apply topically 2 times daily, Disp: 45 g, Rfl: 2  No Known Allergies    Past Medical History:   Diagnosis Date    [facility] [address]     Arthritis     [facility]     High cholesterol     Hypertension     Hypothyroidism     Leg pain     Osteoarthritis     Skin cancer     Stroke [facility])     [phone]    Thyroid disease      Past Surgical History:   Procedure Laterality Date    SKIN CANCER EXCISION      face     Family History   Problem Relation Age of Onset    Stroke Mother     Coronary Art Dis Mother     Other Father         ANEURYSM    Cancer Sister         LUNG - SMOKER    No Known Problems Sister      Social History     Socioeconomic History    Marital status:      Spouse name: Not on file    Number of children: Not on file    Years of education: Not on file    Highest education level: Not on file   Occupational History    Not on file   Tobacco Use    Smoking status: Current Every Day Smoker     Packs/day: 2.00     Years: 60.00     Pack years: 120.00     Types: Cigarettes    Smokeless tobacco: Never Used    Tobacco comment: roll his own   Vaping Use    Vaping Use: Never used   Substance and Sexual Activity    Alcohol use: Yes     Comment: rare    Drug use: No    Sexual activity: Not on file   Other Topics Concern    Not on file   Social History Narrative    Not on file     Social Determinants of Health     Financial Resource Strain:     Difficulty of Paying Living Expenses: Not on file   Food Insecurity:     Worried About Running Out of Food in the Last Year: Not on file    Vandana of Food in the Last Year: Not on file   Transportation Needs:     Lack of Transportation (Medical): Not on file    Lack of Transportation (Non-Medical): Not on file   Physical Activity:     Days of Exercise per Week: Not on file    Minutes of Exercise per Session: Not on file   Stress:     Feeling of Stress : Not on file   Social Connections:     Frequency of Communication with Friends and Family: Not on file    Frequency of Social Gatherings with Friends and Family: Not on file    Attends [employer] Services: Not on file    Active Member of [address] [place] [address] General Assembly or Organizations: Not on file    Attends Club or Organization Meetings: Not on file    Marital Status: Not on file   Intimate Partner Violence:     Fear of Current or Ex-Partner: Not on file    Emotionally Abused: Not on file    Physically Abused: Not on file    Sexually Abused: Not on file   Housing Stability:     Unable to Pay for Housing in the Last Year: Not on file    Number of [place] in the Last Year: Not on file    Unstable Housing in the Last Year: Not on file       Vitals:    01/07/22 1309   BP: 120/60   Pulse: 91   Temp: 97 °F (36.1 °C)   SpO2: 96%   Height: 6' (1.829 m)       Physical Exam  Vitals and nursing note reviewed. HENT:      Head: Normocephalic and atraumatic. Abdominal:      General: Bowel sounds are normal.      Palpations: Abdomen is soft. Tenderness: There is no abdominal tenderness. Musculoskeletal:         General: No swelling, tenderness or deformity. Normal range of motion. Cervical back: Normal range of motion and neck supple. No tenderness. Comments: I am not able to get the patient up on the table for examination so it was done in wheelchair. Cannot reproduce specific area of pain that he was describing. As stated he was able to stand up and bear weight. No pain in doing so. I could not reproduce pain straight leg raising or with hip maneuvers abduction abduction. Skin:     General: Skin is warm and dry. Findings: No bruising or erythema. Neurological:      General: No focal deficit present. Sensory: No sensory deficit. Motor: No weakness. Psychiatric:         Mood and Affect: Mood normal.         Behavior: Behavior normal.                 Assessment and Plan:  Kym Terrazas was seen today for hip pain. Diagnoses and all orders for this visit:    Fall, initial encounter  -     John Rae MD, Orthopaedics, [place]    Hip pain  -     XR HIP LEFT (2-3 VIEWS); Future  -     XR LUMBAR SPINE (2-3 VIEWS); Future  -     Stephanie Henry MD, Orthopaedics, [place]    Pain of left lower extremity  -     XR TIBIA FIBULA LEFT (2 VIEWS); Future  -     XR LUMBAR SPINE (2-3 VIEWS); Future  -     Stephanie Henry MD, Orthopaedics, [place]    Acute pain of left knee  -     XR KNEE LEFT (MIN 4 VIEWS); Future  -     XR LUMBAR SPINE (2-3 VIEWS); Future  -     Stephanie Henry MD, Orthopaedics, [place]    This time    X-rays of his left hip, knee, tib-fib, and LS-spine. Recommended maximizing Tylenol dose for pain. Can continue with his topical BenGay or the equivalent. Commended icing the areas. I did set him up with Dr. Ingris Garcia of orthopedics for an evaluation given his been over a week now. Further fall precautions. Follow-up with PCP. Notify us if not improving. Return for ortho referral, fu pcp. Seen By:  Carlos Manuel Carnes MD      *Document was created using voice recognition software. Note was reviewed however may contain grammatical errors.

## 2022-01-11 ENCOUNTER — OFFICE VISIT (OUTPATIENT)
Dept: ORTHOPEDIC SURGERY | Age: 87
End: 2022-01-11
Payer: MEDICARE

## 2022-01-11 VITALS — WEIGHT: 115 LBS | HEIGHT: 72 IN | BODY MASS INDEX: 15.58 KG/M2

## 2022-01-11 DIAGNOSIS — M54.9 BACK PAIN, UNSPECIFIED BACK LOCATION, UNSPECIFIED BACK PAIN LATERALITY, UNSPECIFIED CHRONICITY: ICD-10-CM

## 2022-01-11 DIAGNOSIS — M25.562 LEFT KNEE PAIN, UNSPECIFIED CHRONICITY: Primary | ICD-10-CM

## 2022-01-11 DIAGNOSIS — M25.559 HIP PAIN: ICD-10-CM

## 2022-01-11 PROCEDURE — G8419 CALC BMI OUT NRM PARAM NOF/U: HCPCS | Performed by: ORTHOPAEDIC SURGERY

## 2022-01-11 PROCEDURE — G8484 FLU IMMUNIZE NO ADMIN: HCPCS | Performed by: ORTHOPAEDIC SURGERY

## 2022-01-11 PROCEDURE — 99204 OFFICE O/P NEW MOD 45 MIN: CPT | Performed by: ORTHOPAEDIC SURGERY

## 2022-01-11 PROCEDURE — G8427 DOCREV CUR MEDS BY ELIG CLIN: HCPCS | Performed by: ORTHOPAEDIC SURGERY

## 2022-01-11 PROCEDURE — 4040F PNEUMOC VAC/ADMIN/RCVD: CPT | Performed by: ORTHOPAEDIC SURGERY

## 2022-01-11 PROCEDURE — 1123F ACP DISCUSS/DSCN MKR DOCD: CPT | Performed by: ORTHOPAEDIC SURGERY

## 2022-01-11 PROCEDURE — 4004F PT TOBACCO SCREEN RCVD TLK: CPT | Performed by: ORTHOPAEDIC SURGERY

## 2022-01-11 NOTE — PROGRESS NOTES
New Hip Patient     Referring Provider:   Georgette Lewis MD  [facility],  [address] [place] [address] Way:   Chief Complaint   Patient presents with    Leg Pain     Lt hip + Knee pain x 1-2 months, he has fallen a couple times recently, he states he has tripped on rugs; c/o leg pain and swelling , comes to appointment in wheelchair, not very stable on feet        HPI:    Michelle Esteves is a 80y.o. year old male who is seen today  for evaluation of left hip pain. He was seen several years ago for similar problem on the right side, at which time we did not feel his pain was related to his hip. He never followed up after that initial visit, now presents for left leg pain. He reports pain that goes all the way down to his foot. He cannot lay flat due to pain. The pain is posterior hip and low back. He has no groin pain. He has not had any recent treatment. . The patient is not working.       PAST MEDICAL HISTORY  Past Medical History:   Diagnosis Date    [facility]     Arthritis     CVA [facility]cerebral vascular accident) ([facility]     High cholesterol     Hypertension     Hypothyroidism     Leg pain     Osteoarthritis     Skin cancer     Stroke [facility])     [id number]    Thyroid disease        PAST SURGICAL HISTORY  Past Surgical History:   Procedure Laterality Date    SKIN CANCER EXCISION      face       FAMILY HISTORY   Family History   Problem Relation Age of Onset    Stroke Mother     Coronary Art Dis Mother     Other Father         ANEURYSM    Cancer Sister         LUNG - SMOKER    No Known Problems Sister        SOCIAL HISTORY  Social History     Occupational History    Not on file   Tobacco Use    Smoking status: Current Every Day Smoker     Packs/day: 2.00     Years: 60.00     Pack years: 120.00     Types: Cigarettes    Smokeless tobacco: Never Used    Tobacco comment: roll his own   Vaping Use    Vaping Use: Never used   Substance and Sexual Activity    Alcohol use: Yes     Comment: rare    Drug use: No    Sexual activity: Not on file       CURRENT MEDICATIONS     Current Outpatient Medications:     mirtazapine (REMERON) 15 MG tablet, , Disp: , Rfl:     levothyroxine (SYNTHROID) 150 MCG tablet, , Disp: , Rfl:     lisinopril-hydroCHLOROthiazide (PRINZIDE;ZESTORETIC) 20-12.5 MG per tablet, Take 1 tablet by mouth 2 times daily, Disp: 180 tablet, Rfl: 1    pravastatin (PRAVACHOL) 40 MG tablet, take 1 tablet by mouth once daily, Disp: 30 tablet, Rfl: 0    memantine (NAMENDA) 10 MG tablet, TAKE 1 TABLET BY MOUTH EVERY MORNING AND EVENING, Disp: 60 tablet, Rfl: 0    B Complex Vitamins (VITAMIN B-COMPLEX) TABS, Take 1 tablet by mouth everyday, Disp: , Rfl:     Bioflavonoid Products (VITAMIN C PLUS) 1000 MG TABS, Take 1 tablet by mouth daily, Disp: , Rfl:     vitamin D (CHOLECALCIFEROL) 1000 UNIT TABS tablet, Take 1 tablet by mouth daily, Disp: , Rfl:     aspirin 325 MG EC tablet, Take 1 tablet by mouth daily, Disp: , Rfl:     clotrimazole-betamethasone (LOTRISONE) 1-0.05 % cream, Apply topically 2 times daily (Patient not taking: Reported on 1/11/2022), Disp: 45 g, Rfl: 2    ALLERGIES  No Known Allergies    Controlled Substances Monitoring:      REVIEW OF SYSTEMS:     Constitutional:  Negative for weight loss, fevers, chills, fatigue  Cardiovascular: Negative for chest pain, palpitations  Pulmonary: Negative for shortness of breath, labored breathing, cough  GI: negative for abdominal pain, nausea, vomitting   MSK: per HPI  Skin: negative for rash, open wounds    All other systems reviewed and are negative           PHYSICAL EXAM     Vitals:    01/11/22 0959   Weight: 115 lb (52.2 kg)   Height: 6' (1.829 m)       Height: 6' (1.829 m)  Weight: 115 lb per pt  BMI:  Body mass index is 15.6 kg/m². General: The patient is alert and oriented x 3, appears to be stated age and in no distress. HEENT: head is normocephalic, atraumatic. EOMI.    Neck: supple, trachea

## 2022-05-27 ENCOUNTER — OFFICE VISIT (OUTPATIENT)
Dept: FAMILY MEDICINE CLINIC | Age: 87
End: 2022-05-27
Payer: MEDICARE

## 2022-05-27 VITALS
OXYGEN SATURATION: 93 % | SYSTOLIC BLOOD PRESSURE: 124 MMHG | HEART RATE: 86 BPM | DIASTOLIC BLOOD PRESSURE: 80 MMHG | TEMPERATURE: 97.1 F | BODY MASS INDEX: 20.61 KG/M2 | WEIGHT: 152 LBS

## 2022-05-27 DIAGNOSIS — R63.4 WEIGHT LOSS: Primary | ICD-10-CM

## 2022-05-27 DIAGNOSIS — E78.2 MIXED HYPERLIPIDEMIA: ICD-10-CM

## 2022-05-27 DIAGNOSIS — E03.9 HYPOTHYROIDISM, UNSPECIFIED TYPE: ICD-10-CM

## 2022-05-27 DIAGNOSIS — R53.83 FATIGUE, UNSPECIFIED TYPE: ICD-10-CM

## 2022-05-27 DIAGNOSIS — R73.01 IMPAIRED FASTING GLUCOSE: ICD-10-CM

## 2022-05-27 DIAGNOSIS — R63.4 WEIGHT LOSS: ICD-10-CM

## 2022-05-27 LAB
ALBUMIN SERPL-MCNC: 4.2 G/DL (ref 3.5–5.2)
ALP BLD-CCNC: 78 U/L (ref 40–129)
ALT SERPL-CCNC: 8 U/L (ref 0–40)
ANION GAP SERPL CALCULATED.3IONS-SCNC: 13 MMOL/L (ref 7–16)
AST SERPL-CCNC: 15 U/L (ref 0–39)
BILIRUB SERPL-MCNC: 0.3 MG/DL (ref 0–1.2)
BUN BLDV-MCNC: 16 MG/DL (ref 6–23)
CALCIUM SERPL-MCNC: 9.4 MG/DL (ref 8.6–10.2)
CHLORIDE BLD-SCNC: 99 MMOL/L (ref 98–107)
CHOLESTEROL, TOTAL: 170 MG/DL (ref 0–199)
CO2: 26 MMOL/L (ref 22–29)
CREAT SERPL-MCNC: 1.2 MG/DL (ref 0.7–1.2)
GFR AFRICAN AMERICAN: >60
GFR NON-AFRICAN AMERICAN: 57 ML/MIN/1.73
GLUCOSE BLD-MCNC: 78 MG/DL (ref 74–99)
HBA1C MFR BLD: 5 % (ref 4–5.6)
HCT VFR BLD CALC: 44.6 % (ref 37–54)
HDLC SERPL-MCNC: 54 MG/DL
HEMOGLOBIN: 13.4 G/DL (ref 12.5–16.5)
LDL CHOLESTEROL CALCULATED: 96 MG/DL (ref 0–99)
MCH RBC QN AUTO: 30.9 PG (ref 26–35)
MCHC RBC AUTO-ENTMCNC: 30 % (ref 32–34.5)
MCV RBC AUTO: 102.8 FL (ref 80–99.9)
PDW BLD-RTO: 12.7 FL (ref 11.5–15)
PLATELET # BLD: 264 E9/L (ref 130–450)
PMV BLD AUTO: 11.9 FL (ref 7–12)
POTASSIUM SERPL-SCNC: 4.3 MMOL/L (ref 3.5–5)
RBC # BLD: 4.34 E12/L (ref 3.8–5.8)
SODIUM BLD-SCNC: 138 MMOL/L (ref 132–146)
T4 FREE: 1.35 NG/DL (ref 0.93–1.7)
TOTAL PROTEIN: 7.8 G/DL (ref 6.4–8.3)
TRIGL SERPL-MCNC: 100 MG/DL (ref 0–149)
TSH SERPL DL<=0.05 MIU/L-ACNC: 21.73 UIU/ML (ref 0.27–4.2)
VITAMIN B-12: 1355 PG/ML (ref 211–946)
VLDLC SERPL CALC-MCNC: 20 MG/DL
WBC # BLD: 5.2 E9/L (ref 4.5–11.5)

## 2022-05-27 PROCEDURE — G8427 DOCREV CUR MEDS BY ELIG CLIN: HCPCS | Performed by: FAMILY MEDICINE

## 2022-05-27 PROCEDURE — 1123F ACP DISCUSS/DSCN MKR DOCD: CPT | Performed by: FAMILY MEDICINE

## 2022-05-27 PROCEDURE — G8420 CALC BMI NORM PARAMETERS: HCPCS | Performed by: FAMILY MEDICINE

## 2022-05-27 PROCEDURE — 4004F PT TOBACCO SCREEN RCVD TLK: CPT | Performed by: FAMILY MEDICINE

## 2022-05-27 PROCEDURE — 3288F FALL RISK ASSESSMENT DOCD: CPT | Performed by: FAMILY MEDICINE

## 2022-05-27 PROCEDURE — 99214 OFFICE O/P EST MOD 30 MIN: CPT | Performed by: FAMILY MEDICINE

## 2022-05-27 ASSESSMENT — PATIENT HEALTH QUESTIONNAIRE - PHQ9
2. FEELING DOWN, DEPRESSED OR HOPELESS: 0
SUM OF ALL RESPONSES TO PHQ QUESTIONS 1-9: 0
SUM OF ALL RESPONSES TO PHQ9 QUESTIONS 1 & 2: 0
SUM OF ALL RESPONSES TO PHQ QUESTIONS 1-9: 0
1. LITTLE INTEREST OR PLEASURE IN DOING THINGS: 0

## 2022-05-27 NOTE — PROGRESS NOTES
[address] [place] [address] presents to the office today for   Chief Complaint   Patient presents with    Fatigue     onset 2-3 mo    Weight Loss    Leg Pain     Presents with wife today     Weight loss  Down 14 lbs in past 18 months  Napping during the day, he does sleep at night but somewhat restless  His legs hurt him to put them in the bed so he does not elevate them  Tylenol OTC not very helpful     Still smoking 2 packs per day  No desire to quit     Due for thyroid labs     Does not drive    Review of Systems     /80 (Site: Left Upper Arm, Position: Sitting)   Pulse 86   Temp 97.1 °F (36.2 °C) (Temporal)   Wt 152 lb (68.9 kg)   SpO2 93%   BMI 20.61 kg/m²   Physical Exam  Constitutional:       Appearance: Normal appearance. HENT:      Head: Normocephalic and atraumatic. Eyes:      Extraocular Movements: Extraocular movements intact. Conjunctiva/sclera: Conjunctivae normal.   Cardiovascular:      Rate and Rhythm: Normal rate. Heart sounds: Normal heart sounds. Pulmonary:      Effort: Pulmonary effort is normal.      Breath sounds: Normal breath sounds. Skin:     General: Skin is warm. Neurological:      Mental Status: He is alert and oriented to person, place, and time.    Psychiatric:         Mood and Affect: Mood normal.         Behavior: Behavior normal.            Current Outpatient Medications:     mirtazapine (REMERON) 15 MG tablet, , Disp: , Rfl:     levothyroxine (SYNTHROID) 150 MCG tablet, , Disp: , Rfl:     lisinopril-hydroCHLOROthiazide (PRINZIDE;ZESTORETIC) 20-12.5 MG per tablet, Take 1 tablet by mouth 2 times daily, Disp: 180 tablet, Rfl: 1    pravastatin (PRAVACHOL) 40 MG tablet, take 1 tablet by mouth once daily, Disp: 30 tablet, Rfl: 0    memantine (NAMENDA) 10 MG tablet, TAKE 1 TABLET BY MOUTH EVERY MORNING AND EVENING, Disp: 60 tablet, Rfl: 0    B Complex Vitamins (VITAMIN B-COMPLEX) TABS, Take 1 tablet by mouth everyday, Disp: , Rfl:    Bioflavonoid Products (VITAMIN C PLUS) 1000 MG TABS, Take 1 tablet by mouth daily, Disp: , Rfl:     vitamin D (CHOLECALCIFEROL) 1000 UNIT TABS tablet, Take 1 tablet by mouth daily, Disp: , Rfl:     aspirin 325 MG EC tablet, Take 1 tablet by mouth daily, Disp: , Rfl:      Past Medical History:   Diagnosis Date    [facility]     Arthritis     [facility]A [facility]cere[facility] accident) [facility]     High cholesterol     Hypertension     Hypothyroidism     Leg pain     Osteoarthritis     Skin cancer     Stroke [facility])     [id number]    Thyroid disease        Emelina Weaver was seen today for fatigue, weight loss and leg pain. Diagnoses and all orders for this visit:    Weight loss  -     TSH; Future  -     Vitamin B12; Future  -     T4, Free; Future  -     Hemoglobin A1C; Future  -     Comprehensive Metabolic Panel; Future  -     CBC; Future  -     Lipid Panel; Future    Fatigue, unspecified type  -     TSH; Future  -     Vitamin B12; Future  -     T4, Free; Future  -     CBC; Future    Hypothyroidism, unspecified type  -     TSH; Future  -     T4, Free; Future    Impaired fasting glucose   -     Hemoglobin A1C; Future    Mixed hyperlipidemia  -     Comprehensive Metabolic Panel; Future  -     Lipid Panel;  Future       Labs today to start with  Med compliance questionable    Laurie Chaidez MD

## 2022-05-31 DIAGNOSIS — E03.9 HYPOTHYROIDISM, UNSPECIFIED TYPE: Primary | ICD-10-CM

## 2022-05-31 RX ORDER — LEVOTHYROXINE SODIUM 175 UG/1
175 TABLET ORAL DAILY
Qty: 30 TABLET | Refills: 1 | Status: SHIPPED
Start: 2022-05-31 | End: 2022-07-18

## 2022-07-08 DIAGNOSIS — E03.9 HYPOTHYROIDISM, UNSPECIFIED TYPE: ICD-10-CM

## 2022-07-08 LAB
T4 FREE: 2.14 NG/DL (ref 0.93–1.7)
TSH SERPL DL<=0.05 MIU/L-ACNC: 0.76 UIU/ML (ref 0.27–4.2)

## 2022-07-16 DIAGNOSIS — E03.9 HYPOTHYROIDISM, UNSPECIFIED TYPE: ICD-10-CM

## 2022-07-18 RX ORDER — LEVOTHYROXINE SODIUM 175 UG/1
TABLET ORAL
Qty: 30 TABLET | Refills: 1 | Status: SHIPPED
Start: 2022-07-18 | End: 2022-09-28

## 2022-09-27 NOTE — TELEPHONE ENCOUNTER
Dr. Doron Bowen MD,     Pharmacy out of pravastatin refills. Order pended for your convenience. Last visit: 05/27/2022, Next visit: 01/13/2023    See encounter note(s) below for complete details. Please let me know if you have any questions. Thank you,  Kerline Eugene, PharmD, [address] [facility]  Department, toll free: [phone], option 1    =======================================================    POPULATION HEALTH CLINICAL PHARMACY REVIEW: ADHERENCE  Identified care gap per [employer]: fills at [facility] : ACE/ARB adherence    Last Visit: 05/27/2022    Patient also appears to be prescribed: pravastatin    ASSESSMENT  ACE/ARB ADHERENCE    Per [employer] Portal:  Lisinopril-hctz 20-12.5mg daily last filled on 09/06/2022 for 90 day supply     BP Readings from Last 3 Encounters:   05/27/22 124/80   01/07/22 120/60   01/28/21 132/62     Lab Results   Component Value Date    CREATININE 1.2 05/27/2022     Estimated Creatinine Clearance: 42 mL/min (based on SCr of 1.2 mg/dL). [address] Records claims through 09/24/2022 (YTD [facility] = 74%; Potential Fail Date: 09/30/2022):   Pravastatin 40mg daily last filled on 05/25/2022 for 90 day supply. Next refill due: 08/23/2022    Per [employer] Portal: same as above    Lab Results   Component Value Date    CHOL 170 05/27/2022    TRIG 100 05/27/2022    HDL 54 05/27/2022    LDLCALC 96 05/27/2022     ALT   Date Value Ref Range Status   05/27/2022 8 0 - 40 U/L Final     AST   Date Value Ref Range Status   05/27/2022 15 0 - 39 U/L Final     The ASCVD Risk score (Karon DK, et al., 2019) failed to calculate for the following reasons:     The 2019 ASCVD risk score is only valid for ages 36 to 78    The patient has a prior MI or stroke diagnosis     PLAN  The following are interventions that have been identified:   - Patient overdue refilling pravastatin and active on home medication list.     Patient identified

## 2022-09-27 NOTE — LETTER
[place]  [address], [address]        Kyree Gramajo   [address]  [facility] [address]           09/28/22     Dear Kyree Gramajo,    We have on file that you are currently taking pravastatin 40mg daily. If you are no longer taking or taking differently, please call us at the number below so that we can discuss this and update your medication profile. This medication is filled and ready for you at [facility]. Please pick this medication up as soon as possible, if you have not already done so. It appears that this medication has not been filled at proper times. We are worried you might be missing doses or not taking it as directed. It is important that you take your medications regularly and try not to miss a single dose.     Some ways to help you remember to take and refill your medications are to:  · Use a pill box, set an alarm, and/or keep your medication near something that you do every day  · Ask your pharmacy if they participate in [facility]", a program where you can  all of your medications on the same day  · Ask your pharmacy if you can be set up with automatic refill, where they will automatically refill your prescription when it is due and let you know it's ready to     Sincerely,   Owen Quinones, PharmD, [facility], [address] [facility], toll free: [phone], option 1

## 2022-09-28 DIAGNOSIS — E03.9 HYPOTHYROIDISM, UNSPECIFIED TYPE: ICD-10-CM

## 2022-09-28 RX ORDER — LEVOTHYROXINE SODIUM 175 UG/1
TABLET ORAL
Qty: 90 TABLET | Refills: 1 | Status: SHIPPED | OUTPATIENT
Start: 2022-09-28

## 2022-09-28 RX ORDER — PRAVASTATIN SODIUM 40 MG
40 TABLET ORAL DAILY
Qty: 90 TABLET | Refills: 1 | Status: SHIPPED | OUTPATIENT
Start: 2022-09-28

## 2022-09-28 NOTE — TELEPHONE ENCOUNTER
Last Appointment:  5/27/2022  Future Appointments   Date Time Provider Natalie Cleveland   1/13/2023 11:45 AM Angélica Brandon  [address]

## 2022-12-28 ENCOUNTER — TELEPHONE (OUTPATIENT)
Dept: PHARMACY | Facility: CLINIC | Age: 87
End: 2022-12-28

## 2022-12-28 NOTE — TELEPHONE ENCOUNTER
[facility] CLINICAL PHARMACY REVIEW: ADHERENCE  Identified care gap per [employer]: fills at [facility] : ACE/ARB adherence    Last Visit: unknown with ACEi combo prescriber    Patient also appears to be prescribed: pravastatin - confirmed adherent    ASSESSMENT  ACE/ARB ADHERENCE    Insurance Records claims through 2022 (ENEIDA Agarwal = Filled only once; Potential Fail Date: 2022 ):   Lisinopril-hydrochlorothiazide 20-12.5mg daily last filled on 2022 for 90 day supply. Next refill due: 2022    Per [employer] Portal: same as above    Per Pharmacy:   Last picked up on 2022 for 90 day supply. 0 refills remaining - Rx     BP Readings from Last 3 Encounters:   22 124/80   22 120/60   21 132/62     Lab Results   Component Value Date    CREATININE 1.2 2022     CrCl cannot be calculated (Patient's most recent lab result is older than the maximum 180 days allowed. ). Lab Results   Component Value Date/Time    LABGLOM 57 2022 11:21 AM     PLAN  The following are interventions that have been identified:   - Patient overdue refilling lisinopril-hctz and active on home medication list.     Patient identified as LIS = 1, therefore patient may be able to receive a 90-day supply for the same cost as a 30-day supply    Pharmacy to send refill request to prescriber. Will send letter to patient.     Future Appointments   Date Time Provider Natalie Cleveland   2023 11:45 AM Cheri Amaral MD [address] [place], PharmD, [address] [facility], [address] [place] [address], toll free: [phone], option 1    =======================================================    For Pharmacy Admin Tracking Only  Program: [address] in place:  No  Recommendation Provided To: Pharmacy: 1  Intervention Detail: Adherence Monitorin  Intervention Accepted By: Pharmacy: 1  Gap Closed?: No   Time Spent (min): 15